# Patient Record
Sex: MALE | Race: WHITE | Employment: FULL TIME | URBAN - NONMETROPOLITAN AREA
[De-identification: names, ages, dates, MRNs, and addresses within clinical notes are randomized per-mention and may not be internally consistent; named-entity substitution may affect disease eponyms.]

---

## 2020-09-29 ENCOUNTER — TELEPHONE (OUTPATIENT)
Dept: FAMILY MEDICINE CLINIC | Age: 35
End: 2020-09-29

## 2020-09-29 ENCOUNTER — HOSPITAL ENCOUNTER (OUTPATIENT)
Age: 35
Discharge: HOME | End: 2020-09-29
Payer: OTHER GOVERNMENT

## 2020-09-29 DIAGNOSIS — R50.9 FEVER, UNSPECIFIED FEVER CAUSE: ICD-10-CM

## 2020-09-29 PROCEDURE — U0003 INFECTIOUS AGENT DETECTION BY NUCLEIC ACID (DNA OR RNA); SEVERE ACUTE RESPIRATORY SYNDROME CORONAVIRUS 2 (SARS-COV-2) (CORONAVIRUS DISEASE [COVID-19]), AMPLIFIED PROBE TECHNIQUE, MAKING USE OF HIGH THROUGHPUT TECHNOLOGIES AS DESCRIBED BY CMS-2020-01-R: HCPCS

## 2020-09-29 NOTE — TELEPHONE ENCOUNTER
Pt started in with fever and headache last night. Asking for covid testing. Orders placed. Advised pt to stay home until he gets results.

## 2020-10-01 LAB — SARS-COV-2: DETECTED

## 2020-10-02 ENCOUNTER — TELEPHONE (OUTPATIENT)
Dept: FAMILY MEDICINE CLINIC | Age: 35
End: 2020-10-02

## 2023-12-26 ENCOUNTER — HOSPITAL ENCOUNTER (OUTPATIENT)
Dept: GENERAL RADIOLOGY | Age: 38
Discharge: HOME | End: 2023-12-26
Attending: FAMILY MEDICINE
Payer: COMMERCIAL

## 2023-12-26 ENCOUNTER — HOSPITAL ENCOUNTER (OUTPATIENT)
Age: 38
End: 2023-12-26
Attending: FAMILY MEDICINE
Payer: COMMERCIAL

## 2023-12-26 ENCOUNTER — HOSPITAL ENCOUNTER (EMERGENCY)
Age: 38
Discharge: HOME | End: 2023-12-26
Attending: FAMILY MEDICINE
Payer: COMMERCIAL

## 2023-12-26 ENCOUNTER — HOSPITAL ENCOUNTER (OUTPATIENT)
Age: 38
Discharge: HOME | End: 2023-12-28
Attending: FAMILY MEDICINE
Payer: COMMERCIAL

## 2023-12-26 ENCOUNTER — TELEPHONE (OUTPATIENT)
Dept: FAMILY MEDICINE CLINIC | Age: 38
End: 2023-12-26

## 2023-12-26 VITALS
HEIGHT: 70 IN | OXYGEN SATURATION: 98 % | TEMPERATURE: 97.7 F | DIASTOLIC BLOOD PRESSURE: 65 MMHG | BODY MASS INDEX: 45.1 KG/M2 | WEIGHT: 315 LBS | RESPIRATION RATE: 11 BRPM | SYSTOLIC BLOOD PRESSURE: 128 MMHG | HEART RATE: 85 BPM

## 2023-12-26 DIAGNOSIS — R00.2 PALPITATIONS: ICD-10-CM

## 2023-12-26 DIAGNOSIS — E87.6 HYPOKALEMIA: ICD-10-CM

## 2023-12-26 DIAGNOSIS — I49.3 UNIFOCAL PVCS: Primary | ICD-10-CM

## 2023-12-26 DIAGNOSIS — R00.2 PALPITATIONS: Primary | ICD-10-CM

## 2023-12-26 LAB
ALBUMIN SERPL BCP-MCNC: 4.2 GM/DL (ref 3.4–5)
ALP SERPL-CCNC: 44 U/L (ref 46–116)
ALT SERPL W P-5'-P-CCNC: 98 U/L (ref 14–63)
ANION GAP SERPL CALC-SCNC: 8 MEQ/L (ref 8–16)
AST SERPL W P-5'-P-CCNC: 41 U/L (ref 15–37)
BASOPHILS # BLD: 0.6 % (ref 0–3)
BASOPHILS ABSOLUTE: 0 THOU/MM3 (ref 0–0.1)
BILIRUB SERPL-MCNC: 0.6 MG/DL (ref 0.2–1)
BUN SERPL-MCNC: 17 MG/DL (ref 7–18)
CALCIUM SERPL-MCNC: 9 MG/DL (ref 8.5–10.1)
CHLORIDE SERPL-SCNC: 106 MEQ/L (ref 98–107)
CO2 SERPL-SCNC: 27 MEQ/L (ref 21–32)
CREAT SERPL-MCNC: 1.1 MG/DL (ref 0.6–1.3)
ECHO BSA: 2.68 M2
EOSINOPHILS ABSOLUTE: 0.2 THOU/MM3 (ref 0–0.5)
EOSINOPHILS RELATIVE PERCENT: 2.4 % (ref 0–4)
GFR SERPL CREATININE-BSD FRML MDRD: > 60 ML/MIN/1.73M2
GLUCOSE SERPL-MCNC: 125 MG/DL (ref 74–106)
HCT VFR BLD CALC: 41.2 % (ref 42–52)
HEMOGLOBIN: 14.1 GM/DL (ref 14–18)
IMMATURE GRANS (ABS): 0.02 THOU/MM3 (ref 0–0.07)
IMMATURE GRANULOCYTES: 0 %
LYMPHOCYTES # BLD AUTO: 39.8 % (ref 15–47)
LYMPHOCYTES ABSOLUTE: 2.5 THOU/MM3 (ref 1–4.8)
MCH RBC QN AUTO: 29.5 PG (ref 26–32)
MCHC RBC AUTO-ENTMCNC: 34.2 GM/DL (ref 31–35)
MCV RBC AUTO: 86.2 FL (ref 80–94)
MONOCYTES: 0.6 THOU/MM3 (ref 0.3–1.3)
MONOCYTES: 10.1 % (ref 0–12)
PDW BLD-RTO: 12.4 % (ref 11.5–14.9)
PLATELET # BLD AUTO: 168 THOU/MM3 (ref 130–400)
PMV BLD AUTO: 10 FL (ref 9.4–12.4)
POTASSIUM SERPL-SCNC: 3.3 MEQ/L (ref 3.5–5.1)
PROT SERPL-MCNC: 7.1 GM/DL (ref 6.4–8.2)
RBC # BLD: 4.78 MILL/MM3 (ref 4.5–6.1)
SEG NEUTROPHILS: 46.8 % (ref 43–75)
SEGMENTED NEUTROPHILS ABSOLUTE COUNT: 2.9 THOU/MM3 (ref 1.8–7.7)
SODIUM SERPL-SCNC: 141 MEQ/L (ref 136–145)
WBC # BLD: 6.2 THOU/MM3 (ref 4.8–10.8)

## 2023-12-26 PROCEDURE — 6370000000 HC RX 637 (ALT 250 FOR IP): Performed by: FAMILY MEDICINE

## 2023-12-26 PROCEDURE — 93225 XTRNL ECG REC<48 HRS REC: CPT

## 2023-12-26 PROCEDURE — 93010 ELECTROCARDIOGRAM REPORT: CPT | Performed by: INTERNAL MEDICINE

## 2023-12-26 PROCEDURE — 99284 EMERGENCY DEPT VISIT MOD MDM: CPT

## 2023-12-26 PROCEDURE — 96374 THER/PROPH/DIAG INJ IV PUSH: CPT

## 2023-12-26 PROCEDURE — 85025 COMPLETE CBC W/AUTO DIFF WBC: CPT

## 2023-12-26 PROCEDURE — 80053 COMPREHEN METABOLIC PANEL: CPT

## 2023-12-26 PROCEDURE — 93005 ELECTROCARDIOGRAM TRACING: CPT | Performed by: FAMILY MEDICINE

## 2023-12-26 PROCEDURE — 2500000003 HC RX 250 WO HCPCS: Performed by: FAMILY MEDICINE

## 2023-12-26 RX ORDER — POTASSIUM CHLORIDE 750 MG/1
20 TABLET, FILM COATED, EXTENDED RELEASE ORAL ONCE
Status: COMPLETED | OUTPATIENT
Start: 2023-12-26 | End: 2023-12-26

## 2023-12-26 RX ORDER — METOPROLOL TARTRATE 1 MG/ML
2.5 INJECTION, SOLUTION INTRAVENOUS ONCE
Status: COMPLETED | OUTPATIENT
Start: 2023-12-26 | End: 2023-12-26

## 2023-12-26 RX ADMIN — METOPROLOL TARTRATE 2.5 MG: 5 INJECTION INTRAVENOUS at 03:00

## 2023-12-26 RX ADMIN — POTASSIUM CHLORIDE 20 MEQ: 750 TABLET, EXTENDED RELEASE ORAL at 03:20

## 2023-12-26 NOTE — TELEPHONE ENCOUNTER
Pt lm on vm stating he was at [facility] last night and needs a follow up in 1 day    Please advise where to schedule

## 2023-12-26 NOTE — DISCHARGE INSTRUCTIONS
At this time will recommend outpatient holter monitor to determine PVC burden and further evaluation by PCP. Patient may require cardiac echo if symptoms suggest.  Will start patient on metoprolol 25 mg twice daily. Care instructions provided. Follow up with ED if chest pain or worsening symptoms.

## 2023-12-26 NOTE — PROGRESS NOTES
EKG called and aware of placement. Holter education given to patient. Journal explained. Holter beep with new batteries applied. Holter applied per standard order secured with tape. HR of 91 present on screen. Pt denies any discomfort. Pt is breathing with ease. Pt aware of drop off and discharged from facility.

## 2023-12-26 NOTE — TELEPHONE ENCOUNTER
Patient aware, is willing to do the holter. Called [facility] they are not able to schedule this but do have one available. Called central scheduling and they are not able to add a holter to the nurse schedule at [facility] either. Alethea Lucia is going to get with her bosses and see how to get patient on the schedule for today. She will call the office back.

## 2023-12-27 ENCOUNTER — TELEPHONE (OUTPATIENT)
Dept: FAMILY MEDICINE CLINIC | Age: 38
End: 2023-12-27

## 2023-12-27 LAB
EKG ATRIAL RATE: 98 BPM
EKG P AXIS: 55 DEGREES
EKG P-R INTERVAL: 186 MS
EKG Q-T INTERVAL: 374 MS
EKG QRS DURATION: 104 MS
EKG QTC CALCULATION (BAZETT): 477 MS
EKG R AXIS: 82 DEGREES
EKG T AXIS: 37 DEGREES
EKG VENTRICULAR RATE: 98 BPM

## 2024-01-01 NOTE — PROGRESS NOTES
Post-Discharge Transitional Care  Follow Up      Dontae Zuñiga   YOB: 1985    Date of Office Visit:  1/2/2024  Date of Hospital Admission: 12/26/23  Date of Hospital Discharge: 12/26/23  Risk of hospital readmission (high >=14%. Medium >=10%) :No data recorded    Care management risk score Rising risk (score 2-5) and Complex Care (Scores >=6): No Risk Score On File     Non face to face  following discharge, date last encounter closed (first attempt may have been earlier): *No documented post hospital discharge outreach found in the last 14 days    Call initiated 2 business days of discharge: *No response recorded in the last 14 days    ASSESSMENT/PLAN:   Hospital discharge follow-up  -     MO DISCHARGE MEDS RECONCILED W/ CURRENT OUTPATIENT MED LIST  Palpitations  -     Echo (TTE) complete (PRN contrast/bubble/strain/3D); Future  -     XR CHEST STANDARD (2 VW); Future  Premature ventricular contraction on electrocardiogram  Routine general medical examination at a health care facility  -     CBC; Future  -     Comprehensive Metabolic Panel, Fasting; Future  -     Lipid Panel; Future  -     TSH with Reflex; Future  Hypokalemia  -     Magnesium; Future      Medical Decision Making: moderate complexity  Return in 3 months (on 4/2/2024) for BP check in 2 weeks with the nurse .    On this date 1/2/2024 I have spent 45 minutes reviewing previous notes, test results and face to face with the patient discussing the diagnosis and importance of compliance with the treatment plan as well as documenting on the day of the visit.       Subjective:   HPI:  Follow up of Hospital problems/diagnosis(es): palpitations that he reports have happened on and off for the past 6 months mostly after drinking heavily the night before.  This time he did not think he had been drinking but admits to not drinking enough water around the time of [place].  The ER ruled out AMI saw PVC on the EKG and sent him home with a

## 2024-01-02 ENCOUNTER — HOSPITAL ENCOUNTER (OUTPATIENT)
Age: 39
Discharge: HOME | End: 2024-01-02
Payer: COMMERCIAL

## 2024-01-02 ENCOUNTER — OFFICE VISIT (OUTPATIENT)
Dept: FAMILY MEDICINE CLINIC | Age: 39
End: 2024-01-02

## 2024-01-02 ENCOUNTER — HOSPITAL ENCOUNTER (OUTPATIENT)
Dept: GENERAL RADIOLOGY | Age: 39
Discharge: HOME | End: 2024-01-02
Attending: FAMILY MEDICINE
Payer: COMMERCIAL

## 2024-01-02 VITALS
RESPIRATION RATE: 18 BRPM | SYSTOLIC BLOOD PRESSURE: 144 MMHG | WEIGHT: 315 LBS | OXYGEN SATURATION: 98 % | BODY MASS INDEX: 45.55 KG/M2 | DIASTOLIC BLOOD PRESSURE: 90 MMHG | TEMPERATURE: 97 F | HEART RATE: 67 BPM

## 2024-01-02 DIAGNOSIS — Z09 HOSPITAL DISCHARGE FOLLOW-UP: Primary | ICD-10-CM

## 2024-01-02 DIAGNOSIS — Z00.00 ROUTINE GENERAL MEDICAL EXAMINATION AT A HEALTH CARE FACILITY: ICD-10-CM

## 2024-01-02 DIAGNOSIS — I49.3 PREMATURE VENTRICULAR CONTRACTION ON ELECTROCARDIOGRAM: ICD-10-CM

## 2024-01-02 DIAGNOSIS — E87.6 HYPOKALEMIA: ICD-10-CM

## 2024-01-02 DIAGNOSIS — R00.2 PALPITATIONS: ICD-10-CM

## 2024-01-02 PROCEDURE — 71046 X-RAY EXAM CHEST 2 VIEWS: CPT

## 2024-01-02 SDOH — ECONOMIC STABILITY: HOUSING INSECURITY
IN THE LAST 12 MONTHS, WAS THERE A TIME WHEN YOU DID NOT HAVE A STEADY PLACE TO SLEEP OR SLEPT IN A SHELTER (INCLUDING NOW)?: NO

## 2024-01-02 SDOH — ECONOMIC STABILITY: INCOME INSECURITY: HOW HARD IS IT FOR YOU TO PAY FOR THE VERY BASICS LIKE FOOD, HOUSING, MEDICAL CARE, AND HEATING?: NOT HARD AT ALL

## 2024-01-02 SDOH — ECONOMIC STABILITY: FOOD INSECURITY: WITHIN THE PAST 12 MONTHS, YOU WORRIED THAT YOUR FOOD WOULD RUN OUT BEFORE YOU GOT MONEY TO BUY MORE.: NEVER TRUE

## 2024-01-02 SDOH — ECONOMIC STABILITY: FOOD INSECURITY: WITHIN THE PAST 12 MONTHS, THE FOOD YOU BOUGHT JUST DIDN'T LAST AND YOU DIDN'T HAVE MONEY TO GET MORE.: NEVER TRUE

## 2024-01-02 ASSESSMENT — PATIENT HEALTH QUESTIONNAIRE - PHQ9
SUM OF ALL RESPONSES TO PHQ QUESTIONS 1-9: 0
SUM OF ALL RESPONSES TO PHQ9 QUESTIONS 1 & 2: 0
1. LITTLE INTEREST OR PLEASURE IN DOING THINGS: 0
SUM OF ALL RESPONSES TO PHQ QUESTIONS 1-9: 0
2. FEELING DOWN, DEPRESSED OR HOPELESS: 0
SUM OF ALL RESPONSES TO PHQ QUESTIONS 1-9: 0
SUM OF ALL RESPONSES TO PHQ QUESTIONS 1-9: 0

## 2024-01-03 ENCOUNTER — NURSE ONLY (OUTPATIENT)
Dept: LAB | Age: 39
End: 2024-01-03

## 2024-01-03 DIAGNOSIS — E87.6 HYPOKALEMIA: ICD-10-CM

## 2024-01-03 DIAGNOSIS — Z00.00 ROUTINE GENERAL MEDICAL EXAMINATION AT A HEALTH CARE FACILITY: ICD-10-CM

## 2024-01-03 LAB
ACQUISITION DURATION: NORMAL S
ALBUMIN SERPL BCG-MCNC: 5.1 G/DL (ref 3.5–5.1)
ALP SERPL-CCNC: 45 U/L (ref 38–126)
ALT SERPL W/O P-5'-P-CCNC: 85 U/L (ref 11–66)
ANION GAP SERPL CALC-SCNC: 10 MEQ/L (ref 8–16)
AST SERPL-CCNC: 42 U/L (ref 5–40)
AVERAGE HEART RATE: 75 BPM
BILIRUB SERPL-MCNC: 1.2 MG/DL (ref 0.3–1.2)
BUN SERPL-MCNC: 17 MG/DL (ref 7–22)
CALCIUM SERPL-MCNC: 10 MG/DL (ref 8.5–10.5)
CHLORIDE SERPL-SCNC: 102 MEQ/L (ref 98–111)
CHOLEST SERPL-MCNC: 185 MG/DL (ref 100–199)
CO2 SERPL-SCNC: 28 MEQ/L (ref 23–33)
CREAT SERPL-MCNC: 1 MG/DL (ref 0.4–1.2)
DEPRECATED RDW RBC AUTO: 38.5 FL (ref 35–45)
ECHO BSA: 2.68 M2
ERYTHROCYTE [DISTWIDTH] IN BLOOD BY AUTOMATED COUNT: 12.3 % (ref 11.5–14.5)
GFR SERPL CREATININE-BSD FRML MDRD: > 60 ML/MIN/1.73M2
GLUCOSE FASTING: 80 MG/DL (ref 70–108)
HCT VFR BLD AUTO: 44.1 % (ref 42–52)
HDLC SERPL-MCNC: 32 MG/DL
HGB BLD-MCNC: 15.1 GM/DL (ref 14–18)
HOOKUP DATE: NORMAL
HOOKUP TIME: NORMAL
LDLC SERPL CALC-MCNC: 132 MG/DL
MAGNESIUM SERPL-MCNC: 2.2 MG/DL (ref 1.6–2.4)
MAX HEART RATE TIME/DATE: NORMAL
MAX HEART RATE: 110 BPM
MCH RBC QN AUTO: 29.8 PG (ref 26–33)
MCHC RBC AUTO-ENTMCNC: 34.2 GM/DL (ref 32.2–35.5)
MCV RBC AUTO: 87.2 FL (ref 80–94)
MIN HEART RATE TIME/DATE: NORMAL
MIN HEART RATE: 48 BPM
NUMBER OF QRS COMPLEXES: NORMAL
NUMBER OF SUPRAVENTRICULAR COUPLETS: 0
NUMBER OF SUPRAVENTRICULAR ECTOPICS: 32
NUMBER OF SUPRAVENTRICULAR ISOLATED BEATS: 6
NUMBER OF VENTRICULAR BIGEMINAL CYCLES: 6
NUMBER OF VENTRICULAR COUPLETS: 0
NUMBER OF VENTRICULAR ECTOPICS: 2160
PLATELET # BLD AUTO: 216 THOU/MM3 (ref 130–400)
PMV BLD AUTO: 10.7 FL (ref 9.4–12.4)
POTASSIUM SERPL-SCNC: 5.1 MEQ/L (ref 3.5–5.2)
PROT SERPL-MCNC: 7.7 G/DL (ref 6.1–8)
RBC # BLD AUTO: 5.06 MILL/MM3 (ref 4.7–6.1)
SODIUM SERPL-SCNC: 140 MEQ/L (ref 135–145)
TRIGL SERPL-MCNC: 107 MG/DL (ref 0–199)
TSH SERPL DL<=0.005 MIU/L-ACNC: 1.94 UIU/ML (ref 0.4–4.2)
WBC # BLD AUTO: 4.7 THOU/MM3 (ref 4.8–10.8)

## 2024-01-04 ENCOUNTER — TELEPHONE (OUTPATIENT)
Dept: FAMILY MEDICINE CLINIC | Age: 39
End: 2024-01-04

## 2024-01-04 DIAGNOSIS — I49.3 FREQUENT PVCS: ICD-10-CM

## 2024-01-04 DIAGNOSIS — R94.31 ABNORMAL HOLTER MONITOR FINDING: Primary | ICD-10-CM

## 2024-01-04 NOTE — TELEPHONE ENCOUNTER
----- Message from Jackelyn Bingham MD sent at 1/4/2024  1:04 PM EST -----  Frequent PVCs  Refer to cardiology    Call patient

## 2024-01-04 NOTE — TELEPHONE ENCOUNTER
Patient aware and voiced understanding, no concerns voiced at this time.     Pt ok with [facility]    Referral placed

## 2024-01-09 ENCOUNTER — TELEPHONE (OUTPATIENT)
Dept: FAMILY MEDICINE CLINIC | Age: 39
End: 2024-01-09

## 2024-01-09 ENCOUNTER — HOSPITAL ENCOUNTER (OUTPATIENT)
Age: 39
Discharge: HOME | End: 2024-01-11
Attending: FAMILY MEDICINE
Payer: COMMERCIAL

## 2024-01-09 VITALS
DIASTOLIC BLOOD PRESSURE: 90 MMHG | HEIGHT: 70 IN | WEIGHT: 315 LBS | BODY MASS INDEX: 45.1 KG/M2 | SYSTOLIC BLOOD PRESSURE: 144 MMHG

## 2024-01-09 DIAGNOSIS — R00.2 PALPITATIONS: ICD-10-CM

## 2024-01-09 LAB
ECHO AO ASC DIAM: 3.4 CM
ECHO AO ASCENDING AORTA INDEX: 1.34 CM/M2
ECHO AV CUSP MM: 2.4 CM
ECHO AV PEAK GRADIENT: 5 MMHG
ECHO AV PEAK VELOCITY: 1.1 M/S
ECHO AV VELOCITY RATIO: 0.82
ECHO BSA: 2.66 M2
ECHO LA AREA 2C: 16.7 CM2
ECHO LA AREA 4C: 16.3 CM2
ECHO LA MAJOR AXIS: 5.1 CM
ECHO LA MINOR AXIS: 5.3 CM
ECHO LA VOL BP: 41 ML (ref 18–58)
ECHO LA VOL MOD A2C: 43 ML (ref 18–58)
ECHO LA VOL MOD A4C: 38 ML (ref 18–58)
ECHO LA VOL/BSA BIPLANE: 16 ML/M2 (ref 16–34)
ECHO LA VOLUME INDEX MOD A2C: 17 ML/M2 (ref 16–34)
ECHO LA VOLUME INDEX MOD A4C: 15 ML/M2 (ref 16–34)
ECHO LV E' LATERAL VELOCITY: 17 CM/S
ECHO LV E' SEPTAL VELOCITY: 7 CM/S
ECHO LV FRACTIONAL SHORTENING: 30 % (ref 28–44)
ECHO LV INTERNAL DIMENSION DIASTOLE INDEX: 2.2 CM/M2
ECHO LV INTERNAL DIMENSION DIASTOLIC: 5.6 CM (ref 4.2–5.9)
ECHO LV INTERNAL DIMENSION SYSTOLIC INDEX: 1.54 CM/M2
ECHO LV INTERNAL DIMENSION SYSTOLIC: 3.9 CM
ECHO LV ISOVOLUMETRIC RELAXATION TIME (IVRT): 63 MS
ECHO LV IVSD: 0.8 CM (ref 0.6–1)
ECHO LV MASS 2D: 234.3 G (ref 88–224)
ECHO LV MASS INDEX 2D: 92.3 G/M2 (ref 49–115)
ECHO LV POSTERIOR WALL DIASTOLIC: 1.3 CM (ref 0.6–1)
ECHO LV RELATIVE WALL THICKNESS RATIO: 0.46
ECHO LVOT PEAK GRADIENT: 3 MMHG
ECHO LVOT PEAK VELOCITY: 0.9 M/S
ECHO MV A VELOCITY: 0.6 M/S
ECHO MV E DECELERATION TIME (DT): 187 MS
ECHO MV E VELOCITY: 1.06 M/S
ECHO MV E/A RATIO: 1.77
ECHO MV E/E' LATERAL: 6.24
ECHO MV E/E' RATIO (AVERAGED): 10.69
ECHO MV REGURGITANT PEAK GRADIENT: 29 MMHG
ECHO MV REGURGITANT PEAK VELOCITY: 2.7 M/S
ECHO PV MAX VELOCITY: 0.8 M/S
ECHO PV PEAK GRADIENT: 3 MMHG
ECHO RV INTERNAL DIMENSION: 3.6 CM
ECHO TV E WAVE: 0.5 M/S
ECHO TV REGURGITANT MAX VELOCITY: 2.61 M/S
ECHO TV REGURGITANT PEAK GRADIENT: 27 MMHG

## 2024-01-09 PROCEDURE — 93306 TTE W/DOPPLER COMPLETE: CPT

## 2024-01-17 ENCOUNTER — OFFICE VISIT (OUTPATIENT)
Dept: CARDIOLOGY CLINIC | Age: 39
End: 2024-01-17
Payer: COMMERCIAL

## 2024-01-17 VITALS
WEIGHT: 304 LBS | DIASTOLIC BLOOD PRESSURE: 84 MMHG | HEART RATE: 88 BPM | BODY MASS INDEX: 43.52 KG/M2 | HEIGHT: 70 IN | SYSTOLIC BLOOD PRESSURE: 144 MMHG

## 2024-01-17 DIAGNOSIS — I49.3 PVC (PREMATURE VENTRICULAR CONTRACTION): Primary | ICD-10-CM

## 2024-01-17 PROCEDURE — 99204 OFFICE O/P NEW MOD 45 MIN: CPT | Performed by: INTERNAL MEDICINE

## 2024-01-17 RX ORDER — CARVEDILOL 6.25 MG/1
6.25 TABLET ORAL 2 TIMES DAILY
Qty: 60 TABLET | Refills: 3 | Status: SHIPPED | OUTPATIENT
Start: 2024-01-17

## 2024-01-17 NOTE — PROGRESS NOTES
New pt.  Abnormal Holter monitor finding. Frequent PVC's.    Pt states he is not having any chest pains, SOB, no tingling or swelling in hands of feet. Pt states he has started having lightheadedness since he's started metoprolol about 3 weeks ago. Pt states he does have fluttering after being active.    All pt medications, allergies to medications, and pharmacy are updated.

## 2024-01-17 NOTE — PROGRESS NOTES
[facility] [place] [facility]  [facility] CARDIOLOGY  [address]  [address]  [place] [address]  Dept: [phone]  Dept Fax: [phone]  Loc: [phone]    Visit Date: 1/17/2024    Mr. Zuñiga is a 38 y.o. male  who presented for:  Chief Complaint   Patient presents with    New Patient     ECHO and Holter monitor results in epic, referred by PCP for abnormal holter.    Abnormal holter  PVCs  Palpitations   HPI:   HPI   Dontae Zuñiga is a pleasant 38 year old male patient who  has a past medical history of Chicken pox and Dislocated shoulder. His FH is positive for HTN in his mother. He denies smoking, alcohol or drug abuse. He was evaluated by his PCP after he was seen in ER in 12/2023 for palpitations. Cr was 1.1. EKG revealed SR, PVC noted. Also, PVCs were noted in ER. His K was low at 3.3. Echo revealed an EF of 50-55%. Holter revealed NSR, PVC with a PVC burden of 1%. He was referred for cardiac evaluation. Repeat labs revealed K 5.1. /84. His blood pressure has been on high side, 140s on many occasions. He was started on Metoprolol. He reports improvement in his palpitations. Patient denies chest pain, shortness of breath, dyspnea on exertion. He has no significant dizziness and denies syncope.        Current Outpatient Medications:     metoprolol tartrate (LOPRESSOR) 25 MG tablet, Take 1 tablet by mouth 2 times daily, Disp: 60 tablet, Rfl: 5    Past Medical History  Dontae  has a past medical history of Chicken pox and Dislocated shoulder.    Social History  Dontae  reports that he has never smoked. He has never used smokeless tobacco. He reports current alcohol use. He reports that he does not use drugs.    Family History  Dontae family history includes Colon Polyps in his father and paternal grandfather.    Past Surgical History   Past Surgical History:   Procedure Laterality Date    APPENDECTOMY  2002       Review of Systems   Constitutional: Negative for

## 2024-01-24 ENCOUNTER — NURSE ONLY (OUTPATIENT)
Dept: FAMILY MEDICINE CLINIC | Age: 39
End: 2024-01-24
Payer: COMMERCIAL

## 2024-01-24 VITALS — SYSTOLIC BLOOD PRESSURE: 130 MMHG | DIASTOLIC BLOOD PRESSURE: 82 MMHG | HEART RATE: 62 BPM

## 2024-01-24 PROCEDURE — 99211 OFF/OP EST MAY X REQ PHY/QHP: CPT | Performed by: FAMILY MEDICINE

## 2024-01-24 NOTE — PROGRESS NOTES
Patient here for nurse visit BP recheck from 1/2/24 visit    /82  P 62    Med list updated today    Per Cardiologist at 1/17/24 visit pt was suppose to stop Metoprolol and start Coreg 6.25 mg but patient has not switched yet.     He has been monitoring his BP's at home and using up the Metoprolol before he switched    Pt's BP's at home have been ranging 120-130's/70-80's. Only symptoms he is having is mild dizziness when he first gets out of bed in the morning and it he goes from crouching to standing position    Pt asking for thoughts on need to switch meds or not

## 2024-01-24 NOTE — PROGRESS NOTES
I would switch to see if the dizziness is any better on the different medication  In the mean time push water 60-80 oz daily and recheck BP 2 weeks after using the new medication    Call patient

## 2024-04-02 ENCOUNTER — OFFICE VISIT (OUTPATIENT)
Dept: FAMILY MEDICINE CLINIC | Age: 39
End: 2024-04-02
Payer: COMMERCIAL

## 2024-04-02 VITALS
HEIGHT: 70 IN | SYSTOLIC BLOOD PRESSURE: 152 MMHG | TEMPERATURE: 97.3 F | DIASTOLIC BLOOD PRESSURE: 86 MMHG | WEIGHT: 293.65 LBS | OXYGEN SATURATION: 96 % | RESPIRATION RATE: 17 BRPM | BODY MASS INDEX: 42.04 KG/M2 | HEART RATE: 95 BPM

## 2024-04-02 DIAGNOSIS — R79.89 ELEVATED LIVER FUNCTION TESTS: ICD-10-CM

## 2024-04-02 DIAGNOSIS — I49.3 FREQUENT PVCS: ICD-10-CM

## 2024-04-02 DIAGNOSIS — I10 PRIMARY HYPERTENSION: ICD-10-CM

## 2024-04-02 DIAGNOSIS — Z00.00 ROUTINE GENERAL MEDICAL EXAMINATION AT A HEALTH CARE FACILITY: Primary | ICD-10-CM

## 2024-04-02 PROCEDURE — 3077F SYST BP >= 140 MM HG: CPT | Performed by: FAMILY MEDICINE

## 2024-04-02 PROCEDURE — 3079F DIAST BP 80-89 MM HG: CPT | Performed by: FAMILY MEDICINE

## 2024-04-02 PROCEDURE — 99213 OFFICE O/P EST LOW 20 MIN: CPT | Performed by: FAMILY MEDICINE

## 2024-04-02 RX ORDER — CARVEDILOL PHOSPHATE 20 MG/1
20 CAPSULE, EXTENDED RELEASE ORAL DAILY
Qty: 30 CAPSULE | Refills: 3 | Status: SHIPPED | OUTPATIENT
Start: 2024-04-02 | End: 2024-04-03

## 2024-04-02 NOTE — PROGRESS NOTES
[facility]  [facility] MEDIC[facility]  [address]  [address]  [place] [address]  Dept: [phone]  Dept Fax: [phone]  Loc: [phone]    Dontae Zuñiga is a 38 y.o. male who presents today for:  Chief Complaint   Patient presents with    3 Month Follow-Up       HPI:     HPI  Well Adult Physical: Patient here for a comprehensive physical exam.The patient reports problems - PVCs better with medication and pushing water.  Do you take any herbs or supplements that were not prescribed by a doctor? no Are you taking calcium supplements? no Are you taking aspirin daily? no   History:  Any STD's in the past? none      Reviewed chart forpast medical history , surgical history , allergies, social history , family history and medications.    Health Maintenance   Topic Date Due    Hepatitis B vaccine (1 of 3 - 3-dose series) Never done    Varicella vaccine (1 of 2 - 2-dose childhood series) Never done    HIV screen  Never done    Hepatitis C screen  Never done    COVID-19 Vaccine (3 - 2023-24 season) 04/02/2029 (Originally 9/1/2023)    Flu vaccine (Season Ended) 08/01/2024    Depression Screen  01/02/2025    DTaP/Tdap/Td vaccine (3 - Td or Tdap) 09/15/2025    Diabetes screen  01/03/2027    Hepatitis A vaccine  Aged Out    Hib vaccine  Aged Out    HPV vaccine  Aged Out    Polio vaccine  Aged Out    Meningococcal (ACWY) vaccine  Aged Out    Pneumococcal 0-64 years Vaccine  Aged Out       Subjective:      Constitutional:Negative for fever, chills, diaphoresis, activity change, appetite change and fatigue.   HENT: Negative for hearing loss, ear pain, congestion, sore throat, rhinorrhea, postnasal drip and ear discharge.    Eyes: Negative for photophobia and visual disturbance.   Respiratory: Negative for cough, chest tightness, shortness of breath and wheezing.    Cardiovascular: Negative for chest pain and leg swelling.   Gastrointestinal: Negative for nausea, vomiting,

## 2024-04-03 ENCOUNTER — TELEPHONE (OUTPATIENT)
Dept: FAMILY MEDICINE CLINIC | Age: 39
End: 2024-04-03

## 2024-04-03 RX ORDER — CARVEDILOL 12.5 MG/1
12.5 TABLET ORAL 2 TIMES DAILY
Qty: 60 TABLET | Refills: 5 | Status: SHIPPED | OUTPATIENT
Start: 2024-04-03

## 2024-04-03 NOTE — TELEPHONE ENCOUNTER
Pt seen by Dr. Bingham yesterday. States he was given a prescription for carvedilol extended release(was just on carvedilol). States he went to  at the pharmacy and the cost went from $1.30 to $200. Stating he cannot afford this. Can a new prescription be sent in an alternative that is more affordable?

## 2024-04-23 ENCOUNTER — NURSE ONLY (OUTPATIENT)
Dept: FAMILY MEDICINE CLINIC | Age: 39
End: 2024-04-23

## 2024-04-23 VITALS — OXYGEN SATURATION: 98 % | DIASTOLIC BLOOD PRESSURE: 86 MMHG | SYSTOLIC BLOOD PRESSURE: 130 MMHG | HEART RATE: 80 BPM

## 2024-04-23 DIAGNOSIS — I10 PRIMARY HYPERTENSION: Primary | ICD-10-CM

## 2024-04-23 PROCEDURE — 99999 PR OFFICE/OUTPT VISIT,PROCEDURE ONLY: CPT | Performed by: FAMILY MEDICINE

## 2024-04-23 NOTE — PROGRESS NOTES
Check BP after being elevated in OV and then starting coreg  Pt is doing good on medication and reports to SE or issues   Pt reports no sx of HTN

## 2024-04-23 NOTE — PROGRESS NOTES
Chief Complaint   Patient presents with    Blood Pressure Check     Check with medication        Vitals:    04/23/24 0758   BP: 130/86   Pulse: 80   SpO2: 98%   ;    No changes to med    Call patient

## 2024-09-21 ENCOUNTER — TELEPHONE (OUTPATIENT)
Dept: FAMILY MEDICINE CLINIC | Age: 39
End: 2024-09-21

## 2024-10-29 NOTE — PROGRESS NOTES
nausea, vomiting, abdominal pain, diarrhea and constipation.   Genitourinary: Negative for dysuria, urgency and frequency.   Neurological: Negative for weakness, light-headedness and headaches.   Psychiatric/Behavioral: Negative for sleep disturbance.      :     Vitals:    10/31/24 0816   BP: 134/86   Site: Right Upper Arm   Position: Sitting   Cuff Size: Large Adult   Pulse: 79   Resp: 16   Temp: 97.9 °F (36.6 °C)   TempSrc: Temporal   Weight: (!) 146 kg (321 lb 14 oz)       Wt Readings from Last 3 Encounters:   10/31/24 (!) 146 kg (321 lb 14 oz)   04/02/24 133.2 kg (293 lb 10.4 oz)   01/17/24 (!) 137.9 kg (304 lb)     Body mass index is 46.18 kg/m².    Physical Exam  Constitutional: Vital signs are normal. He appears well-developed and well-nourished. He is active.   HENT:   Head: Normocephalic and atraumatic.   Right Ear: Tympanic membrane, external ear and ear canal normal. No drainage or tenderness.   Left Ear: Tympanic membrane, external ear and ear canal normal. No drainage or tenderness.   Nose: Nose normal. No mucosal edema or rhinorrhea.   Mouth/Throat: Uvula is midline, oropharynx is clear and moist and mucous membranes are normal. Mucous membranes are not pale. Normal dentition. No posterior oropharyngeal edema or posterior oropharyngeal erythema.   Eyes: Lids are normal. Right eye exhibits no chemosis and no discharge. Left eye exhibits no chemosis and no drainage. Right conjunctiva has no hemorrhage. Left conjunctiva has no hemorrhage. Right eye exhibits normal extraocular motion. Left eye exhibits normal extraocular motion. Right pupil is round and reactive. Left pupil is round and reactive. Pupils are equal.   Cardiovascular: Normal rate, regular rhythm, S1 normal, S2 normal and normal heart sounds.  Exam reveals no gallop.    No murmur heard.  Pulmonary/Chest: Effort normal and breath sounds normal. No respiratory distress. He has no wheezes. He has no rhonchi. He has no rales.   Abdominal: Soft.

## 2024-10-31 ENCOUNTER — OFFICE VISIT (OUTPATIENT)
Dept: FAMILY MEDICINE CLINIC | Age: 39
End: 2024-10-31

## 2024-10-31 VITALS
DIASTOLIC BLOOD PRESSURE: 86 MMHG | RESPIRATION RATE: 16 BRPM | BODY MASS INDEX: 46.18 KG/M2 | TEMPERATURE: 97.9 F | SYSTOLIC BLOOD PRESSURE: 134 MMHG | WEIGHT: 315 LBS | HEART RATE: 79 BPM

## 2024-10-31 DIAGNOSIS — R79.89 ELEVATED LIVER FUNCTION TESTS: ICD-10-CM

## 2024-10-31 DIAGNOSIS — I10 PRIMARY HYPERTENSION: Primary | ICD-10-CM

## 2024-10-31 DIAGNOSIS — I49.3 FREQUENT PVCS: ICD-10-CM

## 2024-10-31 DIAGNOSIS — E66.813 CLASS 3 SEVERE OBESITY DUE TO EXCESS CALORIES WITHOUT SERIOUS COMORBIDITY WITH BODY MASS INDEX (BMI) OF 45.0 TO 49.9 IN ADULT: ICD-10-CM

## 2024-10-31 DIAGNOSIS — E66.01 CLASS 3 SEVERE OBESITY DUE TO EXCESS CALORIES WITHOUT SERIOUS COMORBIDITY WITH BODY MASS INDEX (BMI) OF 45.0 TO 49.9 IN ADULT: ICD-10-CM

## 2024-10-31 DIAGNOSIS — Z00.00 ROUTINE GENERAL MEDICAL EXAMINATION AT A HEALTH CARE FACILITY: ICD-10-CM

## 2024-10-31 RX ORDER — CARVEDILOL 12.5 MG/1
12.5 TABLET ORAL 2 TIMES DAILY
Qty: 180 TABLET | Refills: 3 | Status: SHIPPED | OUTPATIENT
Start: 2024-10-31

## 2025-01-20 ENCOUNTER — PATIENT MESSAGE (OUTPATIENT)
Dept: FAMILY MEDICINE CLINIC | Age: 40
End: 2025-01-20

## 2025-01-20 DIAGNOSIS — Z00.00 ROUTINE GENERAL MEDICAL EXAMINATION AT A HEALTH CARE FACILITY: Primary | ICD-10-CM

## 2025-01-20 DIAGNOSIS — E66.813 CLASS 3 SEVERE OBESITY DUE TO EXCESS CALORIES WITHOUT SERIOUS COMORBIDITY WITH BODY MASS INDEX (BMI) OF 45.0 TO 49.9 IN ADULT: ICD-10-CM

## 2025-01-20 DIAGNOSIS — E66.01 CLASS 3 SEVERE OBESITY DUE TO EXCESS CALORIES WITHOUT SERIOUS COMORBIDITY WITH BODY MASS INDEX (BMI) OF 45.0 TO 49.9 IN ADULT: ICD-10-CM

## 2025-01-20 NOTE — TELEPHONE ENCOUNTER
Last visit- 10/31/2024  Next visit- Visit date not found    Requested Prescriptions     Pending Prescriptions Disp Refills    Semaglutide-Weight Management (WEGOVY) 0.25 MG/0.5ML SOAJ SC injection 2 mL 1     Sig: Inject 0.25 mg into the skin every 7 days

## 2025-01-21 NOTE — TELEPHONE ENCOUNTER
Pt has not started Wegovy.  He states he dropped rx off at [facility] on 1/12 and they left him a vm on 1/17 stating that the PCP called and cancelled rx    Per Al at [facility], rx was not cancelled but does need PA.  Al states they can transfer rx to another pharmacy since we are no longer doing PA for weight loss med    Informed pt of above and gave him a few options for compound pharmacy.  He is going to look into it and call us back if he needs help with anything

## 2025-04-22 ENCOUNTER — PATIENT MESSAGE (OUTPATIENT)
Dept: FAMILY MEDICINE CLINIC | Age: 40
End: 2025-04-22

## 2025-04-22 NOTE — TELEPHONE ENCOUNTER
Last visit- 10/31/2024  Next visit- Visit date not found    Requested Prescriptions     Pending Prescriptions Disp Refills    Semaglutide-Weight Management (WEGOVY) 1 MG/0.5ML SOAJ SC injection 2 mL 0     Sig: Inject into the skin every 7 days